# Patient Record
Sex: MALE | Race: WHITE | NOT HISPANIC OR LATINO | URBAN - METROPOLITAN AREA
[De-identification: names, ages, dates, MRNs, and addresses within clinical notes are randomized per-mention and may not be internally consistent; named-entity substitution may affect disease eponyms.]

---

## 2022-01-04 ENCOUNTER — TELEPHONE (OUTPATIENT)
Dept: OBGYN CLINIC | Facility: HOSPITAL | Age: 32
End: 2022-01-04

## 2022-01-04 NOTE — TELEPHONE ENCOUNTER
1/4/22 PT CALLED AND STATES HE WOULD LIKE APPT FOR TSPINE  HE HAD 2 PREVIOUS SX'S IN PAST, NO RECENT IMAGING  PT [id number] [facility], WE DO NOT PAR  ADVISED PT AND HE WILL CALL INS TO FIND A DOCTOR IN NETWORK

## 2022-02-25 ENCOUNTER — OFFICE VISIT (OUTPATIENT)
Dept: VASCULAR SURGERY | Facility: CLINIC | Age: 32
End: 2022-02-25
Payer: COMMERCIAL

## 2022-02-25 VITALS
TEMPERATURE: 98.5 F | BODY MASS INDEX: 27.57 KG/M2 | SYSTOLIC BLOOD PRESSURE: 114 MMHG | DIASTOLIC BLOOD PRESSURE: 66 MMHG | HEIGHT: 73 IN | WEIGHT: 208 LBS | HEART RATE: 62 BPM

## 2022-02-25 DIAGNOSIS — I83.813 VARICOSE VEINS OF BILATERAL LOWER EXTREMITIES WITH PAIN: ICD-10-CM

## 2022-02-25 PROCEDURE — 99204 OFFICE O/P NEW MOD 45 MIN: CPT | Performed by: PHYSICIAN ASSISTANT

## 2022-02-25 RX ORDER — ALBUTEROL SULFATE 2.5 MG/3ML
2.5 SOLUTION RESPIRATORY (INHALATION)
COMMUNITY

## 2022-02-25 RX ORDER — METHADONE HYDROCHLORIDE 10 MG/5ML
40 SOLUTION ORAL 3 TIMES WEEKLY
COMMUNITY
End: 2022-07-13 | Stop reason: ALTCHOICE

## 2022-02-25 RX ORDER — OXCARBAZEPINE 600 MG/1
600 TABLET, FILM COATED ORAL EVERY 12 HOURS SCHEDULED
COMMUNITY

## 2022-02-25 RX ORDER — ESOMEPRAZOLE MAGNESIUM 10 MG/1
40 GRANULE, FOR SUSPENSION, EXTENDED RELEASE ORAL
COMMUNITY
End: 2022-07-13 | Stop reason: ALTCHOICE

## 2022-02-25 NOTE — PATIENT INSTRUCTIONS
Varicose veins    We had a detailed discussion regarding varicose veins and the treatment of venous disease  We discussed the role of compression and other conservative measures for relief of symptoms related to varicose veins      - Order for prescription graded compression stockings of 20-30 mm Hg  - Wear stocking EVERY day to help control swelling in legs and remove at night  - Elevate legs while at rest  - Continue healthy life-style changes; heart-healthy, low sodium diet; regular exercise   - Education for venous insufficiency  - Follow up as needed for any worsening of symptoms - swelling, pain, fatigue, aching, heaviness  Varicose Veins, Ambulatory Care   GENERAL INFORMATION:   Varicose veins  are veins that become large, twisted, and swollen  They are common on the back of your calves, knees, and thighs  Common symptoms include the following:   · Blue, purple, or bulging veins in your legs     · Pain, swelling, or muscle cramps in your legs    · Feeling of heaviness in your legs  Seek immediate care for the following symptoms:   · A wound that does not heal or is infected    · An injury that has broken your skin and caused your varicose veins to bleed    · Swollen and hard leg    · Pain in your leg that does not go away or gets worse    · Legs or feet turn blue or black    · Warmth, tenderness, and pain in your leg (may also look swollen and red)  Treatment of varicose veins  aims to decrease symptoms, improve appearance, and prevent further problems  Treatment will depend on which veins are affected and how severe your condition is  Prescription pain medicine may be given  Ask how to take this medicine safely  Procedures may be done to remove your varicose veins  Your healthcare provider may inject a solution or use a laser to close the varicose veins  Surgery to remove long veins may also be done   Ask your healthcare provider for more information about procedures used in treating varicose veins   Manage varicose veins:   · Wear pressure stockings  The stockings are tight and put pressure on your legs  They improve blood flow and help prevent clots  · Elevate  your legs above the level of your heart for 15 to 30 minutes several times a day  This will help blood to flow back to your heart  · Avoid sitting or standing for long periods of time  This can cause the blood to collect in your legs and make your symptoms worse  Walk around for a few minutes every hour to get blood moving in your legs  · Avoid wearing tight clothing and shoes  Avoid wearing high-heeled shoes  Do not wear clothes that are tight around the waist     · Get plenty of exercise  Talk to your healthcare provider about the best exercise plan for you  Exercise can decrease your blood pressure and improve your health  Bend or rotate your ankles several times every hour  This will help blood to flow back to the heart  · Maintain a healthy weight  Your heart works harder when you are overweight and this can make varicose vein worse  Ask how much you should weigh  Ask him to help you create a weight loss plan if you are overweight  · Do not smoke  If you smoke, it is never too late to quit  Ask for information if you need help quitting  Follow up with your healthcare provider as directed:  Write down your questions so you remember to ask them during your visits  CARE AGREEMENT:   You have the right to help plan your care  Learn about your health condition and how it may be treated  Discuss treatment options with your caregivers to decide what care you want to receive  You always have the right to refuse treatment  The above information is an  only  It is not intended as medical advice for individual conditions or treatments  Talk to your doctor, nurse or pharmacist before following any medical regimen to see if it is safe and effective for you    © 2014 [address] is for End User's use only and may not be sold, redistributed or otherwise used for commercial purposes  All illustrations and images included in CareNotes® are the copyrighted property of A D A M [employer]  or [employer]

## 2022-02-25 NOTE — PROGRESS NOTES
Assessment/Plan:    Varicose veins of bilateral lower extremities with pain  -     Ambulatory Referral to Vascular Surgery  -     Compression Stocking   few VV in calves R > L 3-8 mm wide; soft and spongy; few spiders   skin health and intact    Discussion:  Patient with bilateral leg pain with mixed Hx spinal disease/neuropathic pain and varicose veins  We had a detailed discussion regarding varicose veins and the treatment of venous disease  We discussed the role of compression and other conservative measures for relief of symptoms related to varicose veins  He agrees to trial medical compression stockings to help with painful varicose veins  His biggest complaints are numbness/tingling in the calf which may be neuropathic and might not be helped with stockings  If he should continue to have leg pain, swelling or worsening of varicose veins, he is instructed to a follow-up for re-evaluation   order for prescription graded compression stockings of 20-30 mm Hg   compression, periodic elevation, heart health/low Na diet, regular exercise    patient education for venous insufficiency   follow up as needed for any worsening vv or symptoms  Subjective:      Patient ID: Benny Marcos is a 32 y o  male  Pt is new and was referred by Miguelangel Pulido MD for VV in both LE with pain with no testing  Pt c/o painful bulging veins in both calves  HPI    Benny Marcos is a 32 y o  male smoker, asthma, seizure d/o who was referred for evaluation of varicose veins  The patient reports bilateral varicose veins in the calves which sometimes are tender and painful  He also has abnormal sensations in the caves  He complains of numbness and tingling in the legs (particularly the R calf) which worsen as the day goes on  However, he often also feels numbness and dull pain to the legs when he wakes up in the morning     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 the legs get tired during the day  The legs feel better with elevation, massage, heating pad and CBD  In addition to varicose veins, he had a right ankle MRSA infection with extended hospitalization a couple of years ago and leg trauma  He also has significant back thoracic and lumbar disc disease and prior back surgeries  He denies leg swelling  No history of DVT or phlebitis  No family history of varicose veins    -shooting pains in the calves both day and night  "nerve damage" in the R leg after heal wound with MRSA  The following portions of the patient's history were reviewed and updated as appropriate: allergies, current medications, past family history, past medical history, past social history, past surgical history and problem list     Review of Systems   Constitutional: Negative  HENT: Negative  Eyes: Negative  Respiratory: Negative  Cardiovascular: Positive for leg swelling (both legs )  Painful bulging veins   Gastrointestinal: Negative  Endocrine: Negative  Genitourinary: Negative  Musculoskeletal: Negative  Skin: Negative  Allergic/Immunologic: Negative  Neurological: Negative  Hematological: Negative  Psychiatric/Behavioral: Negative  Objective:      /66 (BP Location: Left arm, Patient Position: Sitting, Cuff Size: Standard)   Pulse 62   Temp 98 5 °F (36 9 °C) (Tympanic)   Ht 6' 1" (1 854 m)   Wt 94 3 kg (208 lb)   BMI 27 44 kg/m²                   VV in the calves R > L 3-8 mm wide; soft and spongy    Spider veins medial right malleolus    Skin overall healthy and intact  Does have small fungal infection over the dorsum of the right foot  Physical Exam  Constitutional:       Appearance: Normal appearance  HENT:      Head: Normocephalic and atraumatic  Eyes:      General: No scleral icterus  Extraocular Movements: Extraocular movements intact  Pupils: Pupils are equal, round, and reactive to light     Cardiovascular:      Rate and Rhythm: Normal rate and regular rhythm  Pulses: Normal pulses  Heart sounds: Normal heart sounds  No murmur heard  No friction rub  Pulmonary:      Effort: Pulmonary effort is normal  No respiratory distress  Breath sounds: Normal breath sounds  No wheezing or rales  Abdominal:      General: Abdomen is flat  Bowel sounds are normal    Musculoskeletal:      Cervical back: Normal range of motion  Right lower leg: No edema  Left lower leg: No edema  Skin:     General: Skin is warm and dry  Capillary Refill: Capillary refill takes less than 2 seconds  Neurological:      General: No focal deficit present  Mental Status: He is oriented to person, place, and time  Psychiatric:         Mood and Affect: Mood normal          Behavior: Behavior normal              I have reviewed and made appropriate changes to the review of systems input by the medical assistant      Vitals:    02/25/22 1504   BP: 114/66   BP Location: Left arm   Patient Position: Sitting   Cuff Size: Standard   Pulse: 62   Temp: 98 5 °F (36 9 °C)   TempSrc: Tympanic   Weight: 94 3 kg (208 lb)   Height: 6' 1" (1 854 m)       Patient Active Problem List   Diagnosis    Varicose veins of bilateral lower extremities with pain       Past Surgical History:   Procedure Laterality Date    BACK SURGERY      2015, 2016   Laure Rutledge [facility]    MANDIBLE FRACTURE SURGERY      2007    WRIST SURGERY Left     2011, 2012       Family History   Problem Relation Age of Onset    Stomach cancer Father     Varicose Veins Father        Social History     Socioeconomic History    Marital status: Single     Spouse name: Not on file    Number of children: Not on file    Years of education: Not on file    Highest education level: Not on file   Occupational History    Not on file   Tobacco Use    Smoking status: Current Every Day Smoker     Packs/day: 1 00     Types: Cigarettes    Smokeless tobacco: Never Used   Substance and Sexual Activity    Alcohol use:  Yes     Alcohol/week: 1 0 standard drink     Types: 1 Glasses of wine per week    Drug use: Yes     Types: Marijuana    Sexual activity: Not on file   Other Topics Concern    Not on file   Social History Narrative    Not on file     Social Determinants of Health     Financial Resource Strain: Not on file   Food Insecurity: Not on file   Transportation Needs: Not on file   Physical Activity: Not on file   Stress: Not on file   Social Connections: Not on file   Intimate Partner Violence: Not on file   Housing Stability: Not on file       Allergies   Allergen Reactions    Latex Anaphylaxis         Current Outpatient Medications:     albuterol (2 5 mg/3 mL) 0 083 % nebulizer solution, Inhale 2 5 mg, Disp: , Rfl:     esomeprazole (NexIUM) 10 MG packet, Take 40 mg by mouth , Disp: , Rfl:     methadone (DOLOPHINE) 10 MG/5ML solution, Take 40 mL by mouth 3 (three) times a week, Disp: , Rfl:     OXcarbazepine (TRILEPTAL) 600 mg tablet, Take 600 mg by mouth every 12 (twelve) hours 2 times a day, Disp: , Rfl:

## 2022-07-13 ENCOUNTER — OFFICE VISIT (OUTPATIENT)
Dept: URGENT CARE | Facility: CLINIC | Age: 32
End: 2022-07-13
Payer: COMMERCIAL

## 2022-07-13 VITALS — TEMPERATURE: 97.6 F | RESPIRATION RATE: 14 BRPM | OXYGEN SATURATION: 100 % | HEART RATE: 64 BPM

## 2022-07-13 DIAGNOSIS — S05.02XA ABRASION OF LEFT CORNEA, INITIAL ENCOUNTER: Primary | ICD-10-CM

## 2022-07-13 PROCEDURE — 99203 OFFICE O/P NEW LOW 30 MIN: CPT | Performed by: PHYSICIAN ASSISTANT

## 2022-07-13 RX ORDER — ESOMEPRAZOLE MAGNESIUM 40 MG/1
CAPSULE, DELAYED RELEASE ORAL
COMMUNITY
Start: 2022-05-27

## 2022-07-13 RX ORDER — ERYTHROMYCIN 5 MG/G
0.5 OINTMENT OPHTHALMIC
Qty: 3.5 G | Refills: 0 | Status: SHIPPED | OUTPATIENT
Start: 2022-07-13 | End: 2022-07-20

## 2022-07-13 NOTE — PROGRESS NOTES
[facility]        NAME: Matthew Ritter is a 28 y o  male  : 1990    MRN: [id number]  DATE: 2022  TIME: 11:10 AM    Assessment and Plan   Abrasion of left cornea, initial encounter [S05  02XA]  1  Abrasion of left cornea, initial encounter  erythromycin (ILOTYCIN) ophthalmic ointment     F/u ophthalmologist in about 1 wk  Discussed strict return to care precautions as well as red flag symptoms which should prompt immediate ED referral  Pt verbalized understanding and is in agreement with plan  Please follow up with your primary care provider within the next week  Please remember that your visit today was with an urgent care provider and should not replace follow up with your primary care provider for chronic medical issues or annual physicals  Patient Instructions       Follow up with PCP in 3-5 days  Proceed to  ER if symptoms worsen  Chief Complaint     Chief Complaint   Patient presents with    Eye Pain     Pt presents with left eye problem, swelling, discharge, light sensitivity; started last night         History of Present Illness       Eye Pain   The left eye is affected  This is a new problem  The current episode started yesterday  The problem occurs constantly  The problem has been unchanged  The injury mechanism was a direct trauma (was placing danish and thinks a piece of debris shot up at him)  The pain is moderate  There is no known exposure to pink eye  He does not wear contacts  Associated symptoms include an eye discharge, eye redness, itching and photophobia  Pertinent negatives include no blurred vision, double vision, fever, foreign body sensation, nausea, recent URI or vomiting  He has tried eye drops for the symptoms  The treatment provided no relief  Review of Systems   Review of Systems   Constitutional: Negative for fever  Eyes: Positive for photophobia, pain, discharge, redness and itching  Negative for blurred vision and double vision  Respiratory: Negative for cough and shortness of breath  Gastrointestinal: Negative for nausea and vomiting  Neurological: Negative for dizziness and headaches  Current Medications       Current Outpatient Medications:     albuterol (2 5 mg/3 mL) 0 083 % nebulizer solution, Inhale 2 5 mg, Disp: , Rfl:     erythromycin (ILOTYCIN) ophthalmic ointment, Administer 0 5 inches to the right eye every 4 (four) hours for 7 days, Disp: 3 5 g, Rfl: 0    esomeprazole (NexIUM) 40 MG capsule, , Disp: , Rfl:     OXcarbazepine (TRILEPTAL) 600 mg tablet, Take 600 mg by mouth every 12 (twelve) hours 2 times a day, Disp: , Rfl:     Current Allergies     Allergies as of 07/13/2022 - Reviewed 07/13/2022   Allergen Reaction Noted    Latex Anaphylaxis 02/25/2022            The following portions of the patient's history were reviewed and updated as appropriate: allergies, current medications, past family history, past medical history, past social history, past surgical history and problem list      Past Medical History:   Diagnosis Date    Acid reflux     Asthma     Seizures ([facility] [address] )        Past Surgical History:   Procedure Laterality Date    BACK SURGERY      2015, 2016   Laure Rutledge 15    MANDIBLE FRACTURE SURGERY      2007    WRIST SURGERY Left     2011, 2012       Family History   Problem Relation Age of Onset    Stomach cancer Father     Varicose Veins Father          Medications have been verified  Objective   Pulse 64   Temp 97 6 °F (36 4 °C)   Resp 14   SpO2 100%        Physical Exam     Physical Exam  Vitals and nursing note reviewed  Constitutional:       General: He is not in acute distress  Appearance: Normal appearance  He is not ill-appearing  HENT:      Head: Normocephalic and atraumatic  Eyes:      General: Lids are normal  Lids are everted, no foreign bodies appreciated  Vision grossly intact  Left eye: No foreign body, discharge or hordeolum        Extraocular Movements:      Left eye: Normal extraocular motion and no nystagmus  Conjunctiva/sclera:      Left eye: Left conjunctiva is injected  Pupils: Pupils are equal, round, and reactive to light  Left eye: Corneal abrasion and fluorescein uptake present  Cardiovascular:      Rate and Rhythm: Normal rate  Pulmonary:      Effort: Pulmonary effort is normal  No respiratory distress  Skin:     General: Skin is warm and dry  Capillary Refill: Capillary refill takes less than 2 seconds  Neurological:      Mental Status: He is alert and oriented to person, place, and time     Psychiatric:         Behavior: Behavior normal

## 2022-07-13 NOTE — LETTER
July 13, 2022     Patient: Laura Yang   YOB: 1990   Date of Visit: 7/13/2022       To Whom it May Concern:    Laura Yang was seen in my clinic on 7/13/2022  He may return to work on 07/14/2022  If you have any questions or concerns, please don't hesitate to call           Sincerely,          Emmanuel Sanchez PA-C        CC: No Recipients

## 2022-09-09 ENCOUNTER — OFFICE VISIT (OUTPATIENT)
Dept: URGENT CARE | Facility: CLINIC | Age: 32
End: 2022-09-09
Payer: COMMERCIAL

## 2022-09-09 VITALS
SYSTOLIC BLOOD PRESSURE: 133 MMHG | HEIGHT: 73 IN | OXYGEN SATURATION: 99 % | DIASTOLIC BLOOD PRESSURE: 83 MMHG | TEMPERATURE: 98.4 F | WEIGHT: 218 LBS | RESPIRATION RATE: 18 BRPM | HEART RATE: 83 BPM | BODY MASS INDEX: 28.89 KG/M2

## 2022-09-09 DIAGNOSIS — K52.9 GASTROENTERITIS: Primary | ICD-10-CM

## 2022-09-09 PROCEDURE — 99213 OFFICE O/P EST LOW 20 MIN: CPT | Performed by: PHYSICIAN ASSISTANT

## 2022-09-09 RX ORDER — ONDANSETRON 4 MG/1
4 TABLET, FILM COATED ORAL EVERY 8 HOURS PRN
Qty: 20 TABLET | Refills: 0 | Status: SHIPPED | OUTPATIENT
Start: 2022-09-09

## 2022-09-09 NOTE — PROGRESS NOTES
[facility]        NAME: Char Treviño is a 28 y o  male  : 1990    MRN: [id number]  DATE: 2022  TIME: 11:20 AM    Assessment and Plan   Gastroenteritis [K52 9]  1  Gastroenteritis  ondansetron (ZOFRAN) 4 mg tablet         Patient Instructions     Patient Instructions   Recommend BRAT diet and supplementing fluids with electrolytes such as Gatorade or Pedialyte  Can take Zofran as needed for nausea  Follow up with PCP in 3-5 days  Proceed to  ER if symptoms worsen  Chief Complaint     Chief Complaint   Patient presents with    Vomiting     Pt reports of N/V/D started approx 5 days ago  History of Present Illness       Patient is a 27-year-old male presenting today with nausea vomiting and diarrhea x5 days  Patient notes few days ago he was experiencing some abdominal cramps followed by some nausea vomiting, notes that vomiting has subsided but has had some loose stools the last couple of days, has not taken any medication or alleviating factors for symptoms  Denies any known sick contacts  Denies fever, chills, abdominal pain, hematochezia, hematemesis  Review of Systems   Review of Systems   Constitutional: Negative for chills and fever  HENT: Negative for ear pain and sore throat  Eyes: Negative for pain and visual disturbance  Respiratory: Negative for cough and shortness of breath  Cardiovascular: Negative for chest pain and palpitations  Gastrointestinal: Positive for diarrhea, nausea and vomiting  Negative for abdominal pain  Genitourinary: Negative for dysuria and hematuria  Musculoskeletal: Negative for arthralgias and back pain  Skin: Negative for color change and rash  Neurological: Negative for seizures and syncope  All other systems reviewed and are negative          Current Medications       Current Outpatient Medications:     ondansetron (ZOFRAN) 4 mg tablet, Take 1 tablet (4 mg total) by mouth every 8 (eight) hours as needed for nausea or vomiting, Disp: 20 tablet, Rfl: 0    albuterol (2 5 mg/3 mL) 0 083 % nebulizer solution, Inhale 2 5 mg, Disp: , Rfl:     esomeprazole (NexIUM) 40 MG capsule, , Disp: , Rfl:     OXcarbazepine (TRILEPTAL) 600 mg tablet, Take 600 mg by mouth every 12 (twelve) hours 2 times a day, Disp: , Rfl:     Current Allergies     Allergies as of 09/09/2022 - Reviewed 09/09/2022   Allergen Reaction Noted    Latex Anaphylaxis 02/25/2022            The following portions of the patient's history were reviewed and updated as appropriate: allergies, current medications, past family history, past medical history, past social history, past surgical history and problem list      Past Medical History:   Diagnosis Date    Acid reflux     Asthma     Seizures ([facility] [address] )        Past Surgical History:   Procedure Laterality Date    BACK SURGERY      2015, 2016   Laure Rutledge 15    MANDIBLE FRACTURE SURGERY      2007    WRIST SURGERY Left     2011, 2012       Family History   Problem Relation Age of Onset    Stomach cancer Father     Varicose Veins Father          Medications have been verified  Objective   /83   Pulse 83   Temp 98 4 °F (36 9 °C)   Resp 18   Ht 6' 1" (1 854 m)   Wt 98 9 kg (218 lb)   SpO2 99%   BMI 28 76 kg/m²        Physical Exam     Physical Exam  Vitals and nursing note reviewed  Constitutional:       General: He is not in acute distress  Appearance: Normal appearance  He is not ill-appearing  HENT:      Head: Normocephalic and atraumatic  Right Ear: Tympanic membrane, ear canal and external ear normal       Left Ear: Tympanic membrane, ear canal and external ear normal       Nose: Nose normal       Mouth/Throat:      Mouth: Mucous membranes are moist       Pharynx: Oropharynx is clear  Eyes:      Conjunctiva/sclera: Conjunctivae normal    Cardiovascular:      Rate and Rhythm: Normal rate and regular rhythm  Pulses: Normal pulses        Heart sounds: Normal heart sounds  Pulmonary:      Effort: Pulmonary effort is normal       Breath sounds: Normal breath sounds  Abdominal:      General: Abdomen is flat  Bowel sounds are normal       Palpations: Abdomen is soft  Tenderness: There is no abdominal tenderness  Skin:     General: Skin is warm  Capillary Refill: Capillary refill takes less than 2 seconds  Neurological:      Mental Status: He is alert

## 2022-09-09 NOTE — LETTER
September 9, 2022     Patient: Wes Allen   YOB: 1990   Date of Visit: 9/9/2022       To Whom It May Concern: It is my medical opinion that Wes Allen may return to work on 09/10/2022  If you have any questions or concerns, please don't hesitate to call           Sincerely,        Stefanie Staples PA-C    CC: No Recipients

## 2022-09-09 NOTE — PATIENT INSTRUCTIONS
Recommend BRAT diet and supplementing fluids with electrolytes such as Gatorade or Pedialyte  Can take Zofran as needed for nausea